# Patient Record
(demographics unavailable — no encounter records)

---

## 2024-10-15 NOTE — HISTORY OF PRESENT ILLNESS
[Born at ___ Wks Gestation] : The patient was born at [unfilled] weeks gestation [] : via normal spontaneous vaginal delivery [Other: _____] : at [unfilled] [BW: _____] : weight of [unfilled] [Length: _____] : length of [unfilled] [HC: _____] : head circumference of [unfilled] [DW: _____] : Discharge weight was [unfilled] [TcB: _____] : Transcutaneous Bilirubin [unfilled] mg/dL [Expressed Breast milk ___oz/feed] : [unfilled] oz of expressed breast milk per feed [Formula ___ oz/feed] : [unfilled] oz of formula per feed [Hours between feeds ___] : Child is fed every [unfilled] hours [Normal] : Normal [Frequency of stools: ___] : Frequency of stools: [unfilled]  stools [Black] : black [Green/brown] : green/brown [Yellow] : yellow [Loose] : loose consistency [In Bassinet/Crib] : sleeps in bassinet/crib [On back] : sleeps on back [Pacifier] : Uses pacifier [No] : No cigarette smoke exposure [Water heater temperature set at <120 degrees F] : Water heater temperature set at <120 degrees F [Rear facing car seat in back seat] : Rear facing car seat in back seat [Carbon Monoxide Detectors] : Carbon monoxide detectors at home [Smoke Detectors] : Smoke detectors at home. [Hepatitis B Vaccine Given] : Hepatitis B vaccine given [Meconium] : meconium [Age: ___] : [unfilled] year old mother [G: ___] : G [unfilled] [P: ___] : P [unfilled] [Rubella (Immune)] : Rubella immune [VDRL/RPR (Reactive)] : VDRL/RPR reactive [MBT: ____] : MBT - [unfilled] [Breast milk] : breast milk [___ voids per day] : [unfilled] voids per day [NO] : No [RSV vaccine] : RSV vaccine not received by mother at least 14 days prior to delivery [HepBsAG] : HepBsAg negative [HIV] : HIV negative [HepC] : Hepatitis C negative [GBS] : GBS negative [FreeTextEntry9] : A+ [FreeTextEntry8] : 4 days of phototherapy, serum bili was 11.7 on 10/11 prior to discharge  [Exposure to electronic nicotine delivery system] : No exposure to electronic nicotine delivery system [Nirsevimab Given] : Nirsevimab not given

## 2024-10-15 NOTE — DISCUSSION/SUMMARY
[Normal Growth] : growth [Normal Development] : developmental [No Elimination Concerns] : elimination [Continue Regimen] : feeding [No Skin Concerns] : skin [Normal Sleep Pattern] : sleep [Term Infant] : term infant [None] : no known medical problems [Anticipatory Guidance Given] : Anticipatory guidance addressed as per the history of present illness section [ Transition] :  transition [ Care] :  care [Nutritional Adequacy] : nutritional adequacy [Parental Well-Being] : parental well-being [Safety] : safety [Hepatitis B In Hospital] : Hepatitis B administered while in the hospital [No Vaccines] : no vaccines needed [No Medications] : ~He/She~ is not on any medications [Parent/Guardian] : Parent/Guardian [FreeTextEntry1] : Recommend exclusive breastfeeding, 8-12 feedings per day. Mother should continue prenatal vitamins and avoid alcohol. If formula is needed, recommend iron-fortified formulations every 2-3 hrs. When in car, patient should be in rear-facing car seat in back seat. Air dry umbillical stump. Put baby to sleep on back, in own crib with no loose or soft bedding. Limit baby's exposure to others, especially those with fever or unknown vaccine status. trans bili 19, henrry +, sent to ER  Umbillical granuloma cauterized today in office. Procedure well tolerated.

## 2024-10-15 NOTE — PHYSICAL EXAM

## 2024-10-17 NOTE — DISCUSSION/SUMMARY
[FreeTextEntry1] : trans bili 11.4 cont breastfeeding ad galdino, supplementing with formula as needed  return next week for f/u or sooner if not tolerating feeds

## 2024-10-17 NOTE — HISTORY OF PRESENT ILLNESS
[FreeTextEntry6] : hospital discharge for hyperbili discharged yesterday bili was 14.6  mother breast feeding adn supplementing with formula 2 oz every 2-3 hours gaining weight, making wet diapers

## 2024-10-25 NOTE — DISCUSSION/SUMMARY
[FreeTextEntry1] : labs sent  cont breastfeeding ad galdino, supplementing with formula as needed  return next week for f/u or sooner if not tolerating feeds

## 2024-10-25 NOTE — HISTORY OF PRESENT ILLNESS
[de-identified] : bili and weight check [FreeTextEntry6] : dad concerned child looks more yellow in color since monday dad was advised to bring pt in on tuesday but did not come child is taking formula 2 oz every 3 hours as well as breast feeding ad galdino making 3-5 bms daily yellow and seedy at least 6 wet diapers daily

## 2024-11-08 NOTE — DEVELOPMENTAL MILESTONES
[Calms when picked up or spoken to] : calms when picked up or spoken to [Looks briefly at objects] : looks briefly at objects [Alerts to unexpected sound] : alerts to unexpected sound [Makes brief short vowel sounds] : makes brief short vowel sounds [Holds chin up in prone] : holds chin up in prone [Holds fingers more open at rest] : holds fingers more open at rest [Passed] : passed [Normal Development] : Normal Development

## 2024-11-08 NOTE — HISTORY OF PRESENT ILLNESS
[Parents] : parents [Breast milk] : breast milk [Frequency of stools: ___] : Frequency of stools: [unfilled]  stools [per day] : per day. [Dark green] : dark green [Loose] : loose consistency [In Bassinet/Crib] : sleeps in bassinet/crib [On back] : sleeps on back [Co-sleeping] : co-sleeping [No] : No cigarette smoke exposure [Water heater temperature set at <120 degrees F] : Water heater temperature set at <120 degrees F [Rear facing car seat in back seat] : Rear facing car seat in back seat [Carbon Monoxide Detectors] : Carbon monoxide detectors at home [Smoke Detectors] : Smoke detectors at home. [NO] : No [Formula ___ oz in 24hrs] : [unfilled] oz of formula in 24 hours [Vitamins ___] : Patient takes [unfilled] vitamins daily [Loose bedding, pillow, toys, and/or bumpers in crib] : no loose bedding, pillow, toys, and/or bumpers in crib [Pacifier use] : not using pacifier [Exposure to electronic nicotine delivery system] : No exposure to electronic nicotine delivery system [At risk for exposure to TB] : Not at risk for exposure to Tuberculosis  [de-identified] : start vit d now that breastfeeding more than formula

## 2024-11-08 NOTE — DISCUSSION/SUMMARY
[Normal Growth] : growth [Normal Development] : development  [No Elimination Concerns] : elimination [Continue Regimen] : feeding [No Skin Concerns] : skin [Normal Sleep Pattern] : sleep [None] : no medical problems [Anticipatory Guidance Given] : Anticipatory guidance addressed as per the history of present illness section [Parental Well-Being] : parental well-being [Family Adjustment] : family adjustment [Feeding Routines] : feeding routines [Infant Adjustment] : infant adjustment [Safety] : safety [Age Approp Vaccines] : Age appropriate vaccines administered [No Medications] : ~He/She~ is not on any medications [Parent/Guardian] : Parent/Guardian [] : The components of the vaccine(s) to be administered today are listed in the plan of care. The disease(s) for which the vaccine(s) are intended to prevent and the risks have been discussed with the caretaker.  The risks are also included in the appropriate vaccination information statements which have been provided to the patient's caregiver.  The caregiver has given consent to vaccinate. [FreeTextEntry1] : Recommend exclusive breastfeeding, 8-12 feedings per day. Mother should continue prenatal vitamins and avoid alcohol. If formula is needed, recommend iron-fortified formulations, 2-4 oz every 2-3 hrs. When in car, patient should be in rear-facing car seat in back seat. Put baby to sleep on back, in own crib with no loose or soft bedding. Help baby to develop sleep and feeding routines. May offer pacifier if needed. Start tummy time when awake. Limit baby's exposure to others, especially those with fever or unknown vaccine status. Parents counseled to call if rectal temperature >100.4 degrees F. hep b given mylicon for gassiness

## 2024-11-08 NOTE — PHYSICAL EXAM
[Alert] : alert [Normocephalic] : normocephalic [Flat Open Anterior [place]] : flat open anterior fontanelle [PERRL] : PERRL [Red Reflex Bilateral] : red reflex bilateral [Normally Placed Ears] : normally placed ears [Auricles Well Formed] : auricles well formed [Clear Tympanic membranes] : clear tympanic membranes [Light reflex present] : light reflex present [Bony landmarks visible] : bony landmarks visible [Nares Patent] : nares patent [Palate Intact] : palate intact [Uvula Midline] : uvula midline [Supple, full passive range of motion] : supple, full passive range of motion [Symmetric Chest Rise] : symmetric chest rise [Clear to Auscultation Bilaterally] : clear to auscultation bilaterally [Regular Rate and Rhythm] : regular rate and rhythm [S1, S2 present] : S1, S2 present [+2 Femoral Pulses] : +2 femoral pulses [Soft] : soft [Bowel Sounds] : bowel sounds present [Normal external genitailia] : normal external genitalia [Patent Vagina] : vagina patent [Normally Placed] : normally placed [No Abnormal Lymph Nodes Palpated] : no abnormal lymph nodes palpated [Symmetric Flexed Extremities] : symmetric flexed extremities [Startle Reflex] : startle reflex present [Suck Reflex] : suck reflex present [Rooting] : rooting reflex present [Palmar Grasp] : palmar grasp reflex present [Plantar Grasp] : plantar grasp reflex present [Symmetric [place]] : symmetric [place] [Acute Distress] : no acute distress [Discharge] : no discharge [Palpable Masses] : no palpable masses [Murmurs] : no murmurs [Tender] : nontender [Distended] : not distended [Hepatomegaly] : no hepatomegaly [Splenomegaly] : no splenomegaly [Clitoromegaly] : no clitoromegaly [Davis-Ortolani] : negative Davis-Ortolani [Spinal Dimple] : no spinal dimple [Tuft of Hair] : no tuft of hair [Jaundice] : no jaundice [Rash and/or lesion present] : no rash/lesion

## 2024-12-01 NOTE — HISTORY OF PRESENT ILLNESS
[FreeTextEntry6] : pt has been having a non stop cough for 3 to 4 days no fever cough is dry feeds well no vomiting [de-identified] : cough

## 2024-12-01 NOTE — PHYSICAL EXAM
[Wheezing] : no wheezing [Rales] : no rales [Transmitted Upper Airway Sounds] : transmitted upper airway sounds [Subcostal Retractions] : no subcostal retractions [Suprasternal Retractions] : no suprasternal retractions [NL] : warm, clear

## 2024-12-01 NOTE — HISTORY OF PRESENT ILLNESS
[FreeTextEntry6] : pt has been having a non stop cough for 3 to 4 days no fever cough is dry feeds well no vomiting [de-identified] : cough

## 2024-12-10 NOTE — HISTORY OF PRESENT ILLNESS
[Parents] : parents [Breast milk] : breast milk [Formula ___ oz/feed] : [unfilled] oz of formula per feed [Vitamins ___] : Patient takes [unfilled] vitamins daily [Normal] : Normal [Frequency of stools: ___] : Frequency of stools: [unfilled]  stools [per day] : per day. [Yellow] : yellow [In Bassinet/Crib] : sleeps in bassinet/crib [On back] : sleeps on back [Loose bedding, pillow, toys, and/or bumpers in crib] : loose bedding, pillow, toys, and/or bumpers in crib [No] : No cigarette smoke exposure [Rear facing car seat in back seat] : Rear facing car seat in back seat [NO] : No [Co-sleeping] : no co-sleeping [Pacifier use] : not using pacifier [Water heater temperature set at <120 degrees F] : Water heater temperature set at <120 degrees F [Carbon Monoxide Detectors] : Carbon monoxide detectors at home [Smoke Detectors] : Smoke detectors at home. [At risk for exposure to TB] : Not at risk for exposure to Tuberculosis

## 2024-12-10 NOTE — PHYSICAL EXAM
[Alert] : alert [Acute Distress] : no acute distress [Normocephalic] : normocephalic [Flat Open Anterior [place]] : flat open anterior fontanelle [PERRL] : PERRL [Red Reflex Bilateral] : red reflex bilateral [Normally Placed Ears] : normally placed ears [Auricles Well Formed] : auricles well formed [Clear Tympanic membranes] : clear tympanic membranes [Light reflex present] : light reflex present [Bony landmarks visible] : bony landmarks visible [Discharge] : no discharge [Nares Patent] : nares patent [Palate Intact] : palate intact [Uvula Midline] : uvula midline [Supple, full passive range of motion] : supple, full passive range of motion [Palpable Masses] : no palpable masses [Symmetric Chest Rise] : symmetric chest rise [Clear to Auscultation Bilaterally] : clear to auscultation bilaterally [Regular Rate and Rhythm] : regular rate and rhythm [S1, S2 present] : S1, S2 present [Murmurs] : no murmurs [+2 Femoral Pulses] : +2 femoral pulses [Soft] : soft [Tender] : nontender [Distended] : not distended [Bowel Sounds] : bowel sounds present [Hepatomegaly] : no hepatomegaly [Splenomegaly] : no splenomegaly [Normal external genitailia] : normal external genitalia [Clitoromegaly] : no clitoromegaly [Patent Vagina] : vagina patent [Normally Placed] : normally placed [No Abnormal Lymph Nodes Palpated] : no abnormal lymph nodes palpated [Davis-Ortolani] : negative Davis-Ortolani [Symmetric Flexed Extremities] : symmetric flexed extremities [Spinal Dimple] : no spinal dimple [Tuft of Hair] : no tuft of hair [Startle Reflex] : startle reflex present [Suck Reflex] : suck reflex present [Rooting] : rooting reflex present [Palmar Grasp] : palmar grasp reflex present [Plantar Grasp] : plantar grasp reflex present [Symmetric [place]] : symmetric [place] [Rash and/or lesion present] : no rash/lesion

## 2025-02-21 NOTE — DEVELOPMENTAL MILESTONES
[Laughs aloud] : laughs aloud [Turns to voice] : turns to voice [Vocalizes with extending cooing] : vocalizes with extending cooing [Keeps hands unfisted] : keeps hands unfisted [Plays with fingers in midline] : plays with fingers in midline [Grasps objects] : grasps objects [Passed] : passed [Rolls over prone to supine] : does not roll over prone to supine [Supports on elbows & wrists in prone] : does not support on elbows and wrists in prone

## 2025-02-21 NOTE — HISTORY OF PRESENT ILLNESS
[Parents] : parents [Breast milk] : breast milk [Hours between feeds ___] : Child is fed every [unfilled] hours [___ voids per day] : [unfilled] voids per day [Frequency of stools: ___] : Frequency of stools: [unfilled]  stools [per day] : per day. [On back] : sleeps on back [Co-sleeping] : co-sleeping [Sleeps 12-16 hours per 24 hours (including naps)] : sleeps 12-16 hours per 24 hours (including naps) [Tummy time] : tummy time [Screen time only for video chatting] : screen time only for video chatting [No] : No cigarette smoke exposure [Water heater temperature set at <120 degrees F] : Water heater temperature set at <120 degrees F [Rear facing car seat in back seat] : Rear facing car seat in back seat [Carbon Monoxide Detectors] : Carbon monoxide detectors at home [Smoke Detectors] : Smoke detectors at home. [NO] : No [Vitamins ___] : no vitamins [Fruits] : no fruits [Vegetables] : no vegetables [Cereal] : no cereal [In Bassinet/Crib] : does not sleep in bassinet/crib [Loose bedding, pillow, toys, and/or bumpers in crib] : no loose bedding, pillow, toys, and/or bumpers in crib [Pacifier use] : not using pacifier [Exposure to electronic nicotine delivery system] : No exposure to electronic nicotine delivery system [de-identified] : dorys gillette

## 2025-02-21 NOTE — PHYSICAL EXAM
[Alert] : alert [Normocephalic] : normocephalic [Flat Open Anterior [place]] : flat open anterior fontanelle [Red Reflex] : red reflex bilateral [PERRL] : PERRL [Normally Placed Ears] : normally placed ears [Auricles Well Formed] : auricles well formed [Clear Tympanic membranes] : clear tympanic membranes [Light reflex present] : light reflex present [Bony landmarks visible] : bony landmarks visible [Nares Patent] : nares patent [Palate Intact] : palate intact [Uvula Midline] : uvula midline [Symmetric Chest Rise] : symmetric chest rise [Clear to Auscultation Bilaterally] : clear to auscultation bilaterally [Regular Rate and Rhythm] : regular rate and rhythm [S1, S2 present] : S1, S2 present [+2 Femoral Pulses] : (+) 2 femoral pulses [Soft] : soft [Bowel Sounds] : bowel sounds present [Normal External Genitalia] : normal external genitalia [Normal Vaginal Introitus] : normal vaginal introitus [Patent] : patent [Normally Placed] : normally placed [No Abnormal Lymph Nodes Palpated] : no abnormal lymph nodes palpated [Startle Reflex] : startle reflex present [Plantar Grasp] : plantar grasp reflex present [Symmetric Brandon] : symmetric [place] [Acute Distress] : no acute distress [Discharge] : no discharge [Palpable Masses] : no palpable masses [Murmurs] : no murmurs [Tender] : nontender [Distended] : nondistended [Hepatomegaly] : no hepatomegaly [Splenomegaly] : no splenomegaly [Clitoromegaly] : no clitoromegaly [Davis-Ortolani] : negative Davis-Ortolani [Allis Sign] : negative Allis sign [Spinal Dimple] : no spinal dimple [Tuft of Hair] : no tuft of hair [Rash or Lesions] : no rash/lesions

## 2025-04-10 NOTE — HISTORY OF PRESENT ILLNESS
[EENT/Resp Symptoms] : EENT/RESPIRATORY SYMPTOMS [Nasal congestion] : nasal congestion [Runny nose] : runny nose [Cough] : cough [___ Day(s)] : [unfilled] day(s) [Sick Contacts: ___] : sick contacts: [unfilled] [Mucoid discharge] : mucoid discharge [Wet cough] : wet cough [Acetaminophen] : acetaminophen [Stable] : stable [Known exposure to COVID-19] : no known exposure to COVID-19 [Hx of recent COVID-19 infection] : no history of recent COVID-19 infection [Wheezing] : no wheezing [Decreased Appetite] : no decreased appetite [Posttussive emesis] : no posttussive emesis [Vomiting] : no vomiting [Diarrhea] : no diarrhea [Decreased Urine Output] : no decreased urine output [Rash] : no rash

## 2025-04-30 NOTE — DEVELOPMENTAL MILESTONES
[Normal Development] : Normal Development [Pats or smiles at reflection] : pats or smiles at reflection [Begins to turn when name called] : begins to turn when name called [Babbles] : babbles [Sits briefly without support] : sits briefly without support [Reaches for object and transfers] : reaches for object and transfers [Rakes small object with 4 fingers] : rakes small object with 4 fingers [[place] small object on surface] : bangs small object on surface [Passed] : passed [Rolls over prone to supine] : does not roll over prone to supine

## 2025-04-30 NOTE — PHYSICAL EXAM
[Alert] : alert [Acute Distress] : no acute distress [Normocephalic] : normocephalic [Flat Open Anterior [place]] : flat open anterior fontanelle [Red Reflex] : red reflex bilateral [PERRL] : PERRL [Normally Placed Ears] : normally placed ears [Auricles Well Formed] : auricles well formed [Clear Tympanic membranes] : clear tympanic membranes [Light reflex present] : light reflex present [Bony landmarks visible] : bony landmarks visible [Discharge] : no discharge [Nares Patent] : nares patent [Palate Intact] : palate intact [Uvula Midline] : uvula midline [Tooth Eruption] : no tooth eruption [Supple, full passive range of motion] : supple, full passive range of motion [Palpable Masses] : no palpable masses [Symmetric Chest Rise] : symmetric chest rise [Clear to Auscultation Bilaterally] : clear to auscultation bilaterally [Regular Rate and Rhythm] : regular rate and rhythm [S1, S2 present] : S1, S2 present [Murmurs] : no murmurs [+2 Femoral Pulses] : (+) 2 femoral pulses [Soft] : soft [Tender] : nontender [Distended] : nondistended [Bowel Sounds] : bowel sounds present [Hepatomegaly] : no hepatomegaly [Splenomegaly] : no splenomegaly [Normal External Genitalia] : normal external genitalia [Clitoromegaly] : no clitoromegaly [Normal Vaginal Introitus] : normal vaginal introitus [Patent] : patent [Normally Placed] : normally placed [No Abnormal Lymph Nodes Palpated] : no abnormal lymph nodes palpated [Davis-Ortolani] : negative Davis-Ortolani [Allis Sign] : negative Allis sign [Symmetric Buttocks Creases] : symmetric buttocks creases [Spinal Dimple] : no spinal dimple [Tuft of Hair] : no tuft of hair [Plantar Grasp] : plantar grasp reflex present [Cranial Nerves Grossly Intact] : cranial nerves grossly intact [Rash or Lesions] : no rash/lesions

## 2025-04-30 NOTE — PHYSICAL EXAM
[Alert] : alert [Acute Distress] : no acute distress [Normocephalic] : normocephalic [Flat Open Anterior [place]] : flat open anterior fontanelle [Red Reflex] : red reflex bilateral [PERRL] : PERRL [Normally Placed Ears] : normally placed ears [Auricles Well Formed] : auricles well formed [Clear Tympanic membranes] : clear tympanic membranes [Light reflex present] : light reflex present [Bony landmarks visible] : bony landmarks visible [Discharge] : no discharge [Nares Patent] : nares patent [Palate Intact] : palate intact [Uvula Midline] : uvula midline [Tooth Eruption] : no tooth eruption [Supple, full passive range of motion] : supple, full passive range of motion [Palpable Masses] : no palpable masses [Symmetric Chest Rise] : symmetric chest rise [Clear to Auscultation Bilaterally] : clear to auscultation bilaterally [Regular Rate and Rhythm] : regular rate and rhythm [S1, S2 present] : S1, S2 present [Murmurs] : no murmurs [+2 Femoral Pulses] : (+) 2 femoral pulses [Soft] : soft [Tender] : nontender [Distended] : nondistended [Bowel Sounds] : bowel sounds present [Hepatomegaly] : no hepatomegaly [Splenomegaly] : no splenomegaly [Normal External Genitalia] : normal external genitalia [Clitoromegaly] : no clitoromegaly [Normal Vaginal Introitus] : normal vaginal introitus [Patent] : patent [Normally Placed] : normally placed [No Abnormal Lymph Nodes Palpated] : no abnormal lymph nodes palpated [Davis-Ortolani] : negative Davis-Ortolani [Allis Sign] : negative Allis sign [Symmetric Buttocks Creases] : symmetric buttocks creases [Spinal Dimple] : no spinal dimple [Tuft of Hair] : no tuft of hair [Plantar Grasp] : plantar grasp reflex present [Cranial Nerves Grossly Intact] : cranial nerves grossly intact [Rash or Lesions] : no rash/lesions

## 2025-04-30 NOTE — HISTORY OF PRESENT ILLNESS
[Mother] : mother [Breast milk] : breast milk [Fruits] : fruits [Vegetables] : vegetables [Egg] : egg [Meat] : meat [Frequency of stools: ___] : Frequency of stools: [unfilled]  stools [per day] : per day. [In Bassinet/Crib] : sleeps in bassinet/crib [On back] : sleeps on back [Co-sleeping] : co-sleeping [Sleeps 12-16 hours per 24 hours (including naps)] : sleeps 12-16 hours per 24 hours (including naps) [Tummy time] : tummy time [Screen time only for video chatting] : screen time only for video chatting [No] : No cigarette smoke exposure [Water heater temperature set at <120 degrees F] : Water heater temperature set at <120 degrees F [Rear facing car seat in back seat] : Rear facing car seat in back seat [Carbon Monoxide Detectors] : Carbon monoxide detectors at home [Smoke Detectors] : Smoke detectors at home. [NO] : No [Cereal] : no cereal [Fish] : no fish [Peanut] : no peanut [Dairy] : no dairy [Loose bedding, pillow, toys, and/or bumpers in crib] : no loose bedding, pillow, toys, and/or bumpers in crib [Pacifier use] : not using pacifier [Exposure to electronic nicotine delivery system] : No exposure to electronic nicotine delivery system

## 2025-04-30 NOTE — DEVELOPMENTAL MILESTONES
[Normal Development] : Normal Development [Pats or smiles at reflection] : pats or smiles at reflection [Begins to turn when name called] : begins to turn when name called [Babbles] : babbles [Sits briefly without support] : sits briefly without support [Reaches for object and transfers] : reaches for object and transfers [Rakes small object with 4 fingers] : rakes small object with 4 fingers [[place] small object on surface] : bangs small object on surface [Passed] : passed [Rolls over prone to supine] : does not roll over prone to supine

## 2025-06-03 NOTE — HISTORY OF PRESENT ILLNESS
[EENT/Resp Symptoms] : EENT/RESPIRATORY SYMPTOMS [Fever] : fever [Runny nose] : runny nose [FreeTextEntry6] : fever and runny nose since yesterday, tmax 1004 no one sick at home  no diarrhea or vomiting  eating and drinking ok

## 2025-06-03 NOTE — DISCUSSION/SUMMARY
[FreeTextEntry1] : Start Hylands or Zarbees for cough Cool mist humidifier  Vicks vapor rub  Nasal saline spray q4h and suction Tylenol/Motrin for fever  Encourage fluids  RVP sent Advised parent to bring to ER in case of any respiratory distress, fever above 105, signs of dehydration. RTC if symptoms worsen or persist for longer than 3 days  teething Recommend acetaminophen or ibuprofen prn. Offer teething rings. Apply cold or warm compress to gums.

## 2025-06-06 NOTE — HISTORY OF PRESENT ILLNESS
[Derm Symptoms] : DERM SYMPTOMS [Rash] : rash [Face] : face [Extremities] : extremities [Trunk] : trunk [___ Day(s)] : [unfilled] day(s) [Intermittent] : intermittent [New Food] : no new food [New Clothing] : no new clothing [New Skin Products] : no new skin products [New Diapers] : no new diapers [Recent Antibiotic Use] : no recent antibiotic use [Hx of recent COVID-19 infection] : no history of recent COVID-19 infection [Sick Contacts: ___] : no sick contacts [Erythematous] : erythematous [Fever] : fever [Pruritus] : no pruritus [Discharge from affected areas] : no discharge from affected areas [Bleeding from affected areas] : no bleeding from affected areas [URI Symptoms] : URI symptoms [Lip Swelling] : no lip swelling [Vomiting] : no vomiting [Diarrhea] : no diarrhea [Reducted Appetite] : no reduced appetite [de-identified] : rash appeared after patient became afebrile

## 2025-06-06 NOTE — CARE PLAN
[Care Plan reviewed and provided to patient/caregiver] : Care plan reviewed and provided to patient/caregiver [Understands and communicates without difficulty] : Patient/Caregiver understands and communicates without difficulty [FreeTextEntry3] :  rash is from roseola- explained to mother the disease process in detail and told to observe rash will spread and will go away in 3-5 days, no need to apply or need to take any medications